# Patient Record
Sex: FEMALE | Race: BLACK OR AFRICAN AMERICAN | NOT HISPANIC OR LATINO | Employment: UNEMPLOYED | URBAN - METROPOLITAN AREA
[De-identification: names, ages, dates, MRNs, and addresses within clinical notes are randomized per-mention and may not be internally consistent; named-entity substitution may affect disease eponyms.]

---

## 2017-03-08 PROCEDURE — 99284 EMERGENCY DEPT VISIT MOD MDM: CPT | Mod: EDC

## 2017-03-09 ENCOUNTER — HOSPITAL ENCOUNTER (EMERGENCY)
Facility: MEDICAL CENTER | Age: 32
End: 2017-03-09
Attending: EMERGENCY MEDICINE
Payer: MEDICAID

## 2017-03-09 VITALS
TEMPERATURE: 98.8 F | DIASTOLIC BLOOD PRESSURE: 76 MMHG | RESPIRATION RATE: 17 BRPM | HEART RATE: 68 BPM | BODY MASS INDEX: 25.44 KG/M2 | OXYGEN SATURATION: 96 % | SYSTOLIC BLOOD PRESSURE: 110 MMHG | HEIGHT: 66 IN | WEIGHT: 158.29 LBS

## 2017-03-09 DIAGNOSIS — L73.9 FOLLICULITIS: ICD-10-CM

## 2017-03-09 PROCEDURE — 700102 HCHG RX REV CODE 250 W/ 637 OVERRIDE(OP): Mod: EDC | Performed by: EMERGENCY MEDICINE

## 2017-03-09 PROCEDURE — A9270 NON-COVERED ITEM OR SERVICE: HCPCS | Mod: EDC | Performed by: EMERGENCY MEDICINE

## 2017-03-09 RX ORDER — DIPHENHYDRAMINE HCL 25 MG
50 TABLET ORAL ONCE
Status: COMPLETED | OUTPATIENT
Start: 2017-03-09 | End: 2017-03-09

## 2017-03-09 RX ORDER — CLINDAMYCIN HYDROCHLORIDE 150 MG/1
300 CAPSULE ORAL 3 TIMES DAILY
Qty: 40 CAP | Refills: 0 | Status: SHIPPED | OUTPATIENT
Start: 2017-03-09 | End: 2017-03-16

## 2017-03-09 RX ORDER — HYDROXYZINE HYDROCHLORIDE 25 MG/1
25 TABLET, FILM COATED ORAL EVERY 6 HOURS PRN
Qty: 30 TAB | Refills: 0 | Status: SHIPPED | OUTPATIENT
Start: 2017-03-09

## 2017-03-09 RX ORDER — CLINDAMYCIN HYDROCHLORIDE 150 MG/1
300 CAPSULE ORAL ONCE
Status: COMPLETED | OUTPATIENT
Start: 2017-03-09 | End: 2017-03-09

## 2017-03-09 RX ADMIN — CLINDAMYCIN HYDROCHLORIDE 300 MG: 150 CAPSULE ORAL at 01:22

## 2017-03-09 RX ADMIN — DIPHENHYDRAMINE HCL 50 MG: 25 TABLET ORAL at 01:22

## 2017-03-09 NOTE — ED AVS SNAPSHOT
3/9/2017          Kristine Waldrop  [address]  [place] [address]    Dear Kristnie:    [facility] wants to ensure your discharge home is safe and you or your loved ones have had all your questions answered regarding your care after you leave the hospital.    You may receive a telephone call within two days of your discharge.  This call is to make certain you understand your discharge instructions as well as ensure we provided you with the best care possible during your stay with us.     The call will only last approximately 3-5 minutes and will be done by a nurse.    Once again, we want to ensure your discharge home is safe and that you have a clear understanding of any next steps in your care.  If you have any questions or concerns, please do not hesitate to contact us, we are here for you.  Thank you for choosing [facility] for your healthcare needs.    Sincerely,    Jm Hahn    [facility]

## 2017-03-09 NOTE — ED AVS SNAPSHOT
Home Care Instructions                                                                                                                Kristine Waldrop   MRN: [id number]    Department:  [facility], Emergency Dept   Date of Visit:  3/8/2017            [facility], Emergency Dept    [address] [address]    Phone:  [phone]      You were seen by     Ceasar Rojo M.D.      Your Diagnosis Was     Folliculitis     L73.9       Follow-up Information     1. Follow up with [facility].    Why:  for establishment of primary care, for blood pressure management    Contact information    [address] [place] [address] [phone]        2. Follow up with [facility], Emergency Dept.    Specialty:  Emergency Medicine    Why:  If symptoms worsen    Contact information    [address] [address] [facility] [address] [phone]      Medication Information     Review all of your home medications and newly ordered medications with your primary doctor and/or pharmacist as soon as possible. Follow medication instructions as directed by your doctor and/or pharmacist.     Please keep your complete medication list with you and share with your physician. Update the information when medications are discontinued, doses are changed, or new medications (including over-the-counter products) are added; and carry medication information at all times in the event of emergency situations.               Medication List      START taking these medications        Instructions    Morning Afternoon Evening Bedtime    clindamycin 150 MG Caps   Commonly known as:  CLEOCIN        Take 2 Caps by mouth 3 times a day for 7 days.   Dose:  300 mg                        hydrOXYzine 25 MG Tabs   Commonly known as:  ATARAX        Take 1 Tab by mouth every 6 hours as needed for Itching.   Dose:  25 mg                             Where to Get Your Medications      You can  get these medications from any pharmacy     Bring a paper prescription for each of these medications    - clindamycin 150 MG Caps  - hydrOXYzine 25 MG Tabs              Discharge Instructions       Folliculitis  Folliculitis is redness, soreness, and swelling (inflammation) of the hair follicles. This condition can occur anywhere on the body. People with weakened immune systems, diabetes, or obesity have a greater risk of getting folliculitis.  CAUSES  · Bacterial infection. This is the most common cause.  · Fungal infection.  · Viral infection.  · Contact with certain chemicals, especially oils and tars.  Long-term folliculitis can result from bacteria that live in the nostrils. The bacteria may trigger multiple outbreaks of folliculitis over time.  SYMPTOMS  Folliculitis most commonly occurs on the scalp, thighs, legs, back, buttocks, and areas where hair is shaved frequently. An early sign of folliculitis is a small, white or yellow, pus-filled, itchy lesion (pustule). These lesions appear on a red, inflamed follicle. They are usually less than 0.2 inches (5 mm) wide. When there is an infection of the follicle that goes deeper, it becomes a boil or furuncle. A group of closely packed boils creates a larger lesion (carbuncle). Carbuncles tend to occur in hairy, sweaty areas of the body.  DIAGNOSIS   Your caregiver can usually tell what is wrong by doing a physical exam. A sample may be taken from one of the lesions and tested in a lab. This can help determine what is causing your folliculitis.  TREATMENT   Treatment may include:  · Applying warm compresses to the affected areas.  · Taking antibiotic medicines orally or applying them to the skin.  · Draining the lesions if they contain a large amount of pus or fluid.  · Laser hair removal for cases of long-lasting folliculitis. This helps to prevent regrowth of the hair.  HOME CARE INSTRUCTIONS  · Apply warm compresses to the affected areas as directed by your  caregiver.  · If antibiotics are prescribed, take them as directed. Finish them even if you start to feel better.  · You may take over-the-counter medicines to relieve itching.  · Do not shave irritated skin.  · Follow up with your caregiver as directed.  SEEK IMMEDIATE MEDICAL CARE IF:   · You have increasing redness, swelling, or pain in the affected area.  · You have a fever.  MAKE SURE YOU:  · Understand these instructions.  · Will watch your condition.  · Will get help right away if you are not doing well or get worse.     This information is not intended to replace advice given to you by your health care provider. Make sure you discuss any questions you have with your health care provider.     Document Released: 02/26/2003 Document Revised: 01/08/2016 Document Reviewed: 03/19/2013  [employer] Interactive Patient Education ©2016 [employer]            Patient Information     Patient Information    Following emergency treatment: all patient requiring follow-up care must return either to a private physician or a clinic if your condition worsens before you are able to obtain further medical attention, please return to the emergency room.     Billing Information    At [facility], we work to make the billing process streamlined for our patients.  Our Representatives are here to answer any questions you may have regarding your hospital bill.  If you have insurance coverage and have supplied your insurance information to us, we will submit a claim to your insurer on your behalf.  Should you have any questions regarding your bill, we can be reached online or by phone as follows:  Online: You are able pay your bills online or live chat with our representatives about any billing questions you may have. We are here to help Monday - Friday from 8:00am to 7:30pm and 9:00am - 12:00pm on Saturdays.  Please visit [email or web address]  for more information.   Phone:  [phone] or  [phone]    Please note that your emergency physician, surgeon, pathologist, radiologist, anesthesiologist, and other specialists are not employed by [facility] and will therefore bill separately for their services.  Please contact them directly for any questions concerning their bills at the numbers below:     Emergency Physician Services:  [phone]  [facility]:  [phone]  [facility]:  [phone]  [facility]:  [phone]    1. Your final bill may vary from the amount quoted upon discharge if all procedures are not complete at that time, or if your doctor has additional procedures of which we are not aware. You will receive an additional bill if you return to the Emergency Department at [facility] for suture removal regardless of the facility of which the sutures were placed.     2. Please arrange for settlement of this account at the emergency registration.    3. All self-pay accounts are due in full at the time of treatment.  If you are unable to meet this obligation then payment is expected within 4-5 days.     4. If you have had radiology studies (CT, X-ray, Ultrasound, MRI), you have received a preliminary result during your emergency department visit. Please contact the radiology department [phone] to receive a copy of your final result. Please discuss the Final result with your primary physician or with the follow up physician provided.     Crisis Hotline:  [place] Crisis Hotline:  0-889-HTDCDYP or 1-619.801.6228  [place] Crisis Hotline:    1-405.827.6763 or 736-956-1552         ED Discharge Follow Up Questions    1. In order to provide you with very good care, we would like to follow up with a phone call in the next few days.  May we have your permission to contact you?     YES /  NO    2. What is the best phone number to call you? (       )_____-__________    3. What is the best time to call you?      Morning  /  Afternoon  /   Evening                   Patient Signature:  ____________________________________________________________    Date:  ____________________________________________________________

## 2017-03-09 NOTE — DISCHARGE INSTRUCTIONS
Folliculitis  Folliculitis is redness, soreness, and swelling (inflammation) of the hair follicles. This condition can occur anywhere on the body. People with weakened immune systems, diabetes, or obesity have a greater risk of getting folliculitis.  CAUSES  · Bacterial infection. This is the most common cause.  · Fungal infection.  · Viral infection.  · Contact with certain chemicals, especially oils and tars.  Long-term folliculitis can result from bacteria that live in the nostrils. The bacteria may trigger multiple outbreaks of folliculitis over time.  SYMPTOMS  Folliculitis most commonly occurs on the scalp, thighs, legs, back, buttocks, and areas where hair is shaved frequently. An early sign of folliculitis is a small, white or yellow, pus-filled, itchy lesion (pustule). These lesions appear on a red, inflamed follicle. They are usually less than 0.2 inches (5 mm) wide. When there is an infection of the follicle that goes deeper, it becomes a boil or furuncle. A group of closely packed boils creates a larger lesion (carbuncle). Carbuncles tend to occur in hairy, sweaty areas of the body.  DIAGNOSIS   Your caregiver can usually tell what is wrong by doing a physical exam. A sample may be taken from one of the lesions and tested in a lab. This can help determine what is causing your folliculitis.  TREATMENT   Treatment may include:  · Applying warm compresses to the affected areas.  · Taking antibiotic medicines orally or applying them to the skin.  · Draining the lesions if they contain a large amount of pus or fluid.  · Laser hair removal for cases of long-lasting folliculitis. This helps to prevent regrowth of the hair.  HOME CARE INSTRUCTIONS  · Apply warm compresses to the affected areas as directed by your caregiver.  · If antibiotics are prescribed, take them as directed. Finish them even if you start to feel better.  · You may take over-the-counter medicines to relieve itching.  · Do not shave irritated  skin.  · Follow up with your caregiver as directed.  SEEK IMMEDIATE MEDICAL CARE IF:   · You have increasing redness, swelling, or pain in the affected area.  · You have a fever.  MAKE SURE YOU:  · Understand these instructions.  · Will watch your condition.  · Will get help right away if you are not doing well or get worse.     This information is not intended to replace advice given to you by your health care provider. Make sure you discuss any questions you have with your health care provider.     Document Released: 02/26/2003 Document Revised: 01/08/2016 Document Reviewed: 03/19/2013  [employer] Interactive Patient Education ©2016 [employer]

## 2017-03-09 NOTE — ED NOTES
Bedside report completed with MARGIE Nieto.  POC reviewed with all questions and concerns addressed.

## 2017-03-09 NOTE — ED AVS SNAPSHOT
[employer] Access Code: [email or web address]  Expires: 4/8/2017  1:18 AM    Your email address is not on file at [facility].  Email Addresses are required for you to sign up for [employer], please contact [phone] to verify your personal information and to provide your email address prior to attempting to register for [employer].    Kristine Waldrop  [address] [place] [address]    [employer]  A secure, online tool to manage your health information     [facility]’s Kopo Kopo® is a secure, online tool that connects you to your personalized health information from the privacy of your home -- day or night - making it very easy for you to manage your healthcare. Once the activation process is completed, you can even access your medical information using the Kopo Kopo liset, which is available for free in the Apple Liset store or Google Play store.     To learn more about Kopo Kopo, visit [email or web address]    There are two levels of access available (as shown below):   My Chart Features  [facility] Primary Care Doctor [facility]  Specialists [facility]  Urgent  Care Non-[facility] Primary Care Doctor   Email your healthcare team securely and privately 24/7 X X X    Manage appointments: schedule your next appointment; view details of past/upcoming appointments X      Request prescription refills. X      View recent personal medical records, including lab and immunizations X X X X   View health record, including health history, allergies, medications X X X X   Read reports about your outpatient visits, procedures, consult and ER notes X X X X   See your discharge summary, which is a recap of your hospital and/or ER visit that includes your diagnosis, lab results, and care plan X X  X     How to register for [employer]:  Once your e-mail address has been verified, follow the following steps to sign up for [employer].     1. Go to  [email or web address]  2. Click on the Sign Up Now box, which takes you to the New Member Sign Up page. You will need  to provide the following information:  a. Enter your [employer] Access Code exactly as it appears at the top of this page. (You will not need to use this code after you’ve completed the sign-up process. If you do not sign up before the expiration date, you must request a new code.)   b. Enter your date of birth.   c. Enter your home email address.   d. Click Submit, and follow the next screen’s instructions.  3. Create a [employer] ID. This will be your [employer] login ID and cannot be changed, so think of one that is secure and easy to remember.  4. Create a [employer] password. You can change your password at any time.  5. Enter your Password Reset Question and Answer. This can be used at a later time if you forget your password.   6. Enter your e-mail address. This allows you to receive e-mail notifications when new information is available in [employer].  7. Click Sign Up. You can now view your health information.    For assistance activating your [employer] account, call [phone]

## 2017-03-09 NOTE — ED NOTES
Patient to peds 41 - dressed into gown.  Patient denies any known allergies, new lotions, soaps, or medications.  Patient continues to itch her back and under her bra line.  Chart up for ERP.  Will continue to assess.

## 2017-03-09 NOTE — ED NOTES
Chief Complaint   Patient presents with   • Rash     on back X 3-4 days.  states itches.     Denies sob, difficulty breathy or swallowing.  Appears to be in minimal distress.  Triage process explained to patient.  Pt back to waiting room.  Pt instructed to inform RN if any changes or questions arise.

## 2017-03-09 NOTE — ED PROVIDER NOTES
"ED Provider Note    ED Provider Note      CHIEF COMPLAINT  Chief Complaint   Patient presents with   • Rash     on back X 3-4 days.  states itches.       HPI  Kristine Waldrop is a 32 y.o. female who presents to the Emergency Department chief complaint rash. Patient states that she's been experiencing this rash for multiple weeks was on her extensor aspects of her upper and lower extremities she now reports that the rash is troubled her back it is coming extremely pruritic somewhat red and raised in areas. Denies any fevers chills nausea vomiting she denies any new exposures to food soaps detergents or other contacts. Patient does admit to methamphetamine use but states that this hasn't been within the last few weeks and not since the rashes change. She reports the lesions are somewhat painful and rates this as 2 out of 10 without alleviating factors no fevers chills nausea vomiting headache altered mental status no involvement of the palms and soles of feet mouth or any other mucosal aspect.    REVIEW OF SYSTEMS  Pertinent positives include rash, pruritus . Pertinent negatives include no headache altered mental status involvement of palms and soles of feet mucosal surfaces fevers chills nausea vomiting.     PAST MEDICAL HISTORY       SURGICAL HISTORY  patient denies any surgical history    SOCIAL HISTORY  Social History   Substance Use Topics   • Smoking status: Not on file   • Smokeless tobacco: Not on file   • Alcohol Use: Not on file      History   Drug Use Not on file       FAMILY HISTORY  Non-contributory    CURRENT MEDICATIONS  Home Medications     **Home medications have not yet been reviewed for this encounter**          ALLERGIES  No Known Allergies    PHYSICAL EXAM  VITAL SIGNS: /76 mmHg  Pulse 68  Temp(Src) 37.1 °C (98.8 °F)  Resp 17  Ht 1.676 m (5' 5.98\")  Wt 71.8 kg (158 lb 4.6 oz)  BMI 25.56 kg/m2  SpO2 96%  Constitutional: Alert and oriented x 3, minimal distress.  HENT: Normocephalic, " "Atraumatic, Bilateral external ears normal. Nose normal.   Eyes: Pupils are equal and reactive. Conjunctiva normal, non-icteric.   Heart: Regular rate and rythm, no murmurs, equal pulses peripherally x 4.    Lungs: Clear to auscultation bilaterally, no wheezes rales or rhonchi  GI: Soft nontender nondistended positive bowel sounds.  Skin: Erythematous papillary lesions diffusely over the back and the extensor aspects of the upper extremities no areas of confluence, areas do seem to be distributed in a follicular pattern. No fluctuance no drainage  Neurologic: Cranial nerves III through XII grossly intact no sensory deficit no cerebellar dysfunction.   Psychiatric: Appropriate affect for situation          COURSE & MEDICAL DECISION MAKING  Nursing notes, VS, PMSFHx reviewed in chart.        Medical Decision Making: Patient has a diffuse rash of the back and extensor aspects of the upper extremities are erythematous slightly tender no fluctuance no drainage extremely pruritic. Patient given dose of Benadryl clindamycin should be discharged with prescription for clindamycin and Atarax. Return for worsening pain redness swelling fevers chills discharge any other acute concerns otherwise follow-up with primary care as needed. Discharged home in stable condition.  /76 mmHg  Pulse 68  Temp(Src) 37.1 °C (98.8 °F)  Resp 17  Ht 1.676 m (5' 5.98\")  Wt 71.8 kg (158 lb 4.6 oz)  BMI 25.56 kg/m2  SpO2 96%    [address] [address] [phone]    for establishment of primary care, for blood pressure management    [facility], Emergency Dept  [address] [address] [phone]    If symptoms worsen          FINAL IMPRESSION  1. Folliculitis    2. Rash     This dictation has been created using voice recognition software and/or scribes. The accuracy of the dictation is limited by the abilities of the software and the expertise of the scribes. " I expect there may be some errors of grammar and possibly content. I made every attempt to manually correct the errors within my dictation. However, errors related to voice recognition software and/or scribes may still exist and should be interpreted within the appropriate context.

## 2017-03-09 NOTE — ED NOTES
jonnie sy D/C'stephan.  Discharge instructions including the importance of hydration, the use of OTC medications, information on folloclitis and the proper follow up recommendations have been provided to the pt.  Pt states understanding.  Pt states all questions have been answered.  A copy of the discharge instructions have been provided to pt.  A signed copy is in the chart.  Prescription for atarax and clindamycin provided to pt.   Pt walked out of department; pt in NAD, awake, alert, interactive and age appropriate

## 2017-03-30 ENCOUNTER — HOSPITAL ENCOUNTER (EMERGENCY)
Facility: MEDICAL CENTER | Age: 32
End: 2017-03-30
Attending: EMERGENCY MEDICINE
Payer: MEDICAID

## 2017-03-30 VITALS
DIASTOLIC BLOOD PRESSURE: 95 MMHG | WEIGHT: 156.97 LBS | HEART RATE: 121 BPM | TEMPERATURE: 98.2 F | OXYGEN SATURATION: 100 % | BODY MASS INDEX: 25.35 KG/M2 | SYSTOLIC BLOOD PRESSURE: 138 MMHG | RESPIRATION RATE: 16 BRPM

## 2017-03-30 DIAGNOSIS — L30.9 ECZEMA, UNSPECIFIED TYPE: ICD-10-CM

## 2017-03-30 DIAGNOSIS — Z87.2 HISTORY OF URTICARIA: ICD-10-CM

## 2017-03-30 DIAGNOSIS — R21 RASH: ICD-10-CM

## 2017-03-30 PROCEDURE — 700102 HCHG RX REV CODE 250 W/ 637 OVERRIDE(OP): Performed by: EMERGENCY MEDICINE

## 2017-03-30 PROCEDURE — A9270 NON-COVERED ITEM OR SERVICE: HCPCS | Performed by: EMERGENCY MEDICINE

## 2017-03-30 PROCEDURE — 99283 EMERGENCY DEPT VISIT LOW MDM: CPT

## 2017-03-30 RX ORDER — HYDROXYZINE HYDROCHLORIDE 25 MG/1
25 TABLET, FILM COATED ORAL 3 TIMES DAILY PRN
Qty: 30 TAB | Refills: 0 | Status: SHIPPED | OUTPATIENT
Start: 2017-03-30

## 2017-03-30 RX ORDER — ACETAMINOPHEN 325 MG/1
650 TABLET ORAL ONCE
Status: COMPLETED | OUTPATIENT
Start: 2017-03-30 | End: 2017-03-30

## 2017-03-30 RX ORDER — TRIAMCINOLONE ACETONIDE 5 MG/G
OINTMENT TOPICAL
Qty: 1 TUBE | Refills: 0 | Status: SHIPPED | OUTPATIENT
Start: 2017-03-30 | End: 2017-04-06

## 2017-03-30 RX ORDER — DIPHENHYDRAMINE HCL 25 MG
25 TABLET ORAL ONCE
Status: COMPLETED | OUTPATIENT
Start: 2017-03-30 | End: 2017-03-30

## 2017-03-30 RX ADMIN — ACETAMINOPHEN 650 MG: 325 TABLET, FILM COATED ORAL at 10:30

## 2017-03-30 RX ADMIN — DIPHENHYDRAMINE HCL 25 MG: 25 TABLET ORAL at 10:30

## 2017-03-30 ASSESSMENT — PAIN SCALES - GENERAL: PAINLEVEL_OUTOF10: 9

## 2017-03-30 ASSESSMENT — LIFESTYLE VARIABLES: DO YOU DRINK ALCOHOL: NO

## 2017-03-30 NOTE — ED PROVIDER NOTES
ED Provider Note    Scribed for Oleg Valencia M.D. by Miguel Ángel Miller. 3/30/2017, 10:17 AM.    Primary care provider: Pcp Pt States None  Means of arrival: Walk-in  History obtained from: Patient  History limited by: None    CHIEF COMPLAINT  Chief Complaint   Patient presents with   • Hives     pt reports that she had hives, worse at night, and causing headache. taking benadryl without relief.      HPI  Kristine Waldrop is a 32 y.o. female who presents to the Emergency Department with worsening rashes onset several weeks ago. The patient was seen in the ED on 3/9/2017 for similar symptoms and was given Benadryl with minimal relief. She states she continues to experience pruritic rashes on her back that worsens at night and denies any fevers. She also denies any new medications or foods. Patient denies past history of chicken pox or hives.    REVIEW OF SYSTEMS  Pertinent positives include rash.   Pertinent negatives include no fever.    E.      PAST MEDICAL HISTORY  No history of chicken pox.    SURGICAL HISTORY  patient denies any surgical history    SOCIAL HISTORY   History   Drug Use  Yes        FAMILY HISTORY  No family history noted    CURRENT MEDICATIONS    Current outpatient prescriptions:   •  triamcinolone (ARISTOCORT) 0.5 % ointment, Apply to the affected areas twice daily for 7 days., Disp: 1 Tube, Rfl: 0  •  hydrOXYzine (ATARAX) 25 MG Tab, Take 1 Tab by mouth every 6 hours as needed for Itching., Disp: 30 Tab, Rfl: 0    ALLERGIES  No Known Allergies    PHYSICAL EXAM  VITAL SIGNS: /95 mmHg  Pulse 121  Temp(Src) 36.8 °C (98.2 °F)  Resp 16  Wt 71.2 kg (156 lb 15.5 oz)  SpO2 100%    Nursing note and vitals reviewed.  Constitutional: No distress.   HENT: Head is atraumatic. Oropharynx is moist.   Eyes: Conjunctivae are normal. Pupils are equal, round, and reactive to light.   Cardiovascular: Normal peripheral perfusion  Respiratory: No respiratory distress.   Musculoskeletal: Normal range of motion.    Neurological: Alert. No focal deficits noted.    Skin: Scattered excoriations skin changes in the mid back most consistent with contact dermatitis/eczema. No apparent urticaria.   Psych: Appropriate for clinical situation     COURSE & MEDICAL DECISION MAKING  Nursing notes, VS, PMSFHx reviewed in chart.    Review of past medical records shows the patient was last seen in the ED on the 3/9/2017 for rash.      10:17 AM - Patient seen and examined at bedside. Patient will be treated with Benadryl 25mg PO and Tylenol 650mg PO. The patient will be discharged with Aristocort and Atarax and should return if symptoms worsen or if new symptoms arise. The patient understands and agrees to plan.     DISPOSITION:  Patient will be discharged home in stable condition.    FOLLOW UP:  [facility], Emergency Dept  [address] [address] [phone]    If symptoms worsen    [address] [address]  [phone]  Schedule an appointment as soon as possible for a visit        OUTPATIENT MEDICATIONS:  New Prescriptions    HYDROXYZINE (ATARAX) 25 MG TAB    Take 1 Tab by mouth 3 times a day as needed for Itching.    TRIAMCINOLONE (ARISTOCORT) 0.5 % OINTMENT    Apply to the affected areas twice daily for 7 days.     The patient was discharged home with an information sheet on Eczema and told to return immediately for any signs or symptoms listed.  The patient agreed to the discharge precautions and follow-up plan which is documented in EPIC.    FINAL IMPRESSION  1. Rash    2. History of urticaria    3. Eczema, unspecified type        IMiguel Ángel), am scribing for, and in the presence of, Oleg Valencia M.D    Electronically signed by: Miguel Ángel Miller (Pramod), 3/30/2017    IOleg M.D. providers found personally performed the services described in this documentation, as scribed by Miguel Ángel Miller in my presence, and it is both accurate and  complete.    The note accurately reflects work and decisions made by me.  Oleg Valencia  3/30/2017  2:00 PM

## 2017-03-30 NOTE — ED NOTES
Pt ambulatory at discharge. No rash or hives noted. Verbalized understanding of all discharge and follow up instructions.

## 2017-03-30 NOTE — DISCHARGE INSTRUCTIONS
Eczema  Eczema, also called atopic dermatitis, is a skin disorder that causes inflammation of the skin. It causes a red rash and dry, scaly skin. The skin becomes very itchy. Eczema is generally worse during the cooler winter months and often improves with the warmth of summer. Eczema usually starts showing signs in infancy. Some children outgrow eczema, but it may last through adulthood.   CAUSES   The exact cause of eczema is not known, but it appears to run in families. People with eczema often have a family history of eczema, allergies, asthma, or hay fever. Eczema is not contagious.  Flare-ups of the condition may be caused by:   · Contact with something you are sensitive or allergic to.    · Stress.  SIGNS AND SYMPTOMS  · Dry, scaly skin.    · Red, itchy rash.    · Itchiness. This may occur before the skin rash and may be very intense.    DIAGNOSIS   The diagnosis of eczema is usually made based on symptoms and medical history.  TREATMENT   Eczema cannot be cured, but symptoms usually can be controlled with treatment and other strategies. A treatment plan might include:  · Controlling the itching and scratching.    ¨ Use over-the-counter antihistamines as directed for itching. This is especially useful at night when the itching tends to be worse.    ¨ Use over-the-counter steroid creams as directed for itching.    ¨ Avoid scratching. Scratching makes the rash and itching worse. It may also result in a skin infection (impetigo) due to a break in the skin caused by scratching.    · Keeping the skin well moisturized with creams every day. This will seal in moisture and help prevent dryness. Lotions that contain alcohol and water should be avoided because they can dry the skin.    · Limiting exposure to things that you are sensitive or allergic to (allergens).    · Recognizing situations that cause stress.    · Developing a plan to manage stress.    HOME CARE INSTRUCTIONS   · Only take over-the-counter or  prescription medicines as directed by your health care provider.    · Do not use anything on the skin without checking with your health care provider.    · Keep baths or showers short (5 minutes) in warm (not hot) water. Use mild cleansers for bathing. These should be unscented. You may add nonperfumed bath oil to the bath water. It is best to avoid soap and bubble bath.    · Immediately after a bath or shower, when the skin is still damp, apply a moisturizing ointment to the entire body. This ointment should be a petroleum ointment. This will seal in moisture and help prevent dryness. The thicker the ointment, the better. These should be unscented.    · Keep fingernails cut short. Children with eczema may need to wear soft gloves or mittens at night after applying an ointment.    · Dress in clothes made of cotton or cotton blends. Dress lightly, because heat increases itching.    · A child with eczema should stay away from anyone with fever blisters or cold sores. The virus that causes fever blisters (herpes simplex) can cause a serious skin infection in children with eczema.  SEEK MEDICAL CARE IF:   · Your itching interferes with sleep.    · Your rash gets worse or is not better within 1 week after starting treatment.    · You see pus or soft yellow scabs in the rash area.    · You have a fever.    · You have a rash flare-up after contact with someone who has fever blisters.       This information is not intended to replace advice given to you by your health care provider. Make sure you discuss any questions you have with your health care provider.     Document Released: 12/15/2001 Document Revised: 10/08/2014 Document Reviewed: 07/21/2014  [employer] Interactive Patient Education ©2016 [employer]

## 2017-03-30 NOTE — ED AVS SNAPSHOT
3/30/2017          Kristine Waldrop  [address]  [place] [address]    Dear Kristine:    [facility] wants to ensure your discharge home is safe and you or your loved ones have had all your questions answered regarding your care after you leave the hospital.    You may receive a telephone call within two days of your discharge.  This call is to make certain you understand your discharge instructions as well as ensure we provided you with the best care possible during your stay with us.     The call will only last approximately 3-5 minutes and will be done by a nurse.    Once again, we want to ensure your discharge home is safe and that you have a clear understanding of any next steps in your care.  If you have any questions or concerns, please do not hesitate to contact us, we are here for you.  Thank you for choosing [facility] for your healthcare needs.    Sincerely,    Jm Hahn    [facility]

## 2017-03-30 NOTE — ED NOTES
Chief Complaint   Patient presents with   • Hives     pt reports that she had hives, worse at night, and causing headache. taking benadryl without relief.      Pt itching in the lobby.   Blood pressure 138/95, pulse 121, temperature 36.8 °C (98.2 °F), resp. rate 16, weight 71.2 kg (156 lb 15.5 oz), SpO2 100 %.  Pt informed of wait times. Educated on triage process.  Asked to return to triage RN for any new or worsening of symptoms. Thanked for patience.

## 2017-03-30 NOTE — ED AVS SNAPSHOT
Front Flip Access Code: [email or web address]  Expires: 4/8/2017  2:18 AM    Your email address is not on file at [employer].  Email Addresses are required for you to sign up for Front Flip, please contact [phone] to verify your personal information and to provide your email address prior to attempting to register for Front Flip.    Kristine Waldrop  [address] [place] [address]    Front Flip  A secure, online tool to manage your health information     [employer]’s Front Flip® is a secure, online tool that connects you to your personalized health information from the privacy of your home -- day or night - making it very easy for you to manage your healthcare. Once the activation process is completed, you can even access your medical information using the Front Flip liset, which is available for free in the Apple Liset store or Google Play store.     To learn more about Front Flip, visit [email or web address]    There are two levels of access available (as shown below):   My Chart Features  [facility] Primary Care Doctor [facility]  Specialists [facility]  Urgent  Care Non-[facility] Primary Care Doctor   Email your healthcare team securely and privately 24/7 X X X    Manage appointments: schedule your next appointment; view details of past/upcoming appointments X      Request prescription refills. X      View recent personal medical records, including lab and immunizations X X X X   View health record, including health history, allergies, medications X X X X   Read reports about your outpatient visits, procedures, consult and ER notes X X X X   See your discharge summary, which is a recap of your hospital and/or ER visit that includes your diagnosis, lab results, and care plan X X  X     How to register for Kivedat:  Once your e-mail address has been verified, follow the following steps to sign up for Kivedat.     1. Go to  [email or web address]  2. Click on the Sign Up Now box, which takes you to the New Member Sign Up page. You will need  to provide the following information:  a. Enter your TakeCharge Access Code exactly as it appears at the top of this page. (You will not need to use this code after you’ve completed the sign-up process. If you do not sign up before the expiration date, you must request a new code.)   b. Enter your date of birth.   c. Enter your home email address.   d. Click Submit, and follow the next screen’s instructions.  3. Create a TakeCharge ID. This will be your TakeCharge login ID and cannot be changed, so think of one that is secure and easy to remember.  4. Create a TakeCharge password. You can change your password at any time.  5. Enter your Password Reset Question and Answer. This can be used at a later time if you forget your password.   6. Enter your e-mail address. This allows you to receive e-mail notifications when new information is available in TakeCharge.  7. Click Sign Up. You can now view your health information.    For assistance activating your TakeCharge account, call [phone]

## 2017-04-08 ENCOUNTER — HOSPITAL ENCOUNTER (EMERGENCY)
Facility: MEDICAL CENTER | Age: 32
End: 2017-04-09
Attending: EMERGENCY MEDICINE
Payer: MEDICAID

## 2017-04-08 VITALS
DIASTOLIC BLOOD PRESSURE: 78 MMHG | SYSTOLIC BLOOD PRESSURE: 118 MMHG | TEMPERATURE: 97.2 F | HEART RATE: 89 BPM | OXYGEN SATURATION: 100 % | RESPIRATION RATE: 14 BRPM | BODY MASS INDEX: 25.26 KG/M2 | WEIGHT: 157.19 LBS | HEIGHT: 66 IN

## 2017-04-08 DIAGNOSIS — L24.89 IRRITANT CONTACT DERMATITIS DUE TO OTHER AGENTS: ICD-10-CM

## 2017-04-08 PROCEDURE — 99282 EMERGENCY DEPT VISIT SF MDM: CPT

## 2017-04-08 ASSESSMENT — LIFESTYLE VARIABLES: DO YOU DRINK ALCOHOL: NO

## 2017-04-08 NOTE — ED AVS SNAPSHOT
4/9/2017          Kristine Waldrop  [address]  [place] [address]    Dear Kristine:    [facility] wants to ensure your discharge home is safe and you or your loved ones have had all your questions answered regarding your care after you leave the hospital.    You may receive a telephone call within two days of your discharge.  This call is to make certain you understand your discharge instructions as well as ensure we provided you with the best care possible during your stay with us.     The call will only last approximately 3-5 minutes and will be done by a nurse.    Once again, we want to ensure your discharge home is safe and that you have a clear understanding of any next steps in your care.  If you have any questions or concerns, please do not hesitate to contact us, we are here for you.  Thank you for choosing [facility] for your healthcare needs.    Sincerely,    Jm Hahn    [facility]

## 2017-04-08 NOTE — ED AVS SNAPSHOT
MediaSilo Access Code: [email or web address]  Expires: 5/9/2017 12:02 AM    Your email address is not on file at [employer].  Email Addresses are required for you to sign up for MediaSilo, please contact [phone] to verify your personal information and to provide your email address prior to attempting to register for MediaSilo.    Kristine Waldrop  [address] [place] [address]    [employer]  A secure, online tool to manage your health information     [employer]’s MediaSilo® is a secure, online tool that connects you to your personalized health information from the privacy of your home -- day or night - making it very easy for you to manage your healthcare. Once the activation process is completed, you can even access your medical information using the MediaSilo liset, which is available for free in the Apple Liset store or Google Play store.     To learn more about MediaSilo, visit [email or web address]    There are two levels of access available (as shown below):   My Chart Features  [facility] Primary Care Doctor [facility]  Specialists [facility]  Urgent  Care Non-[facility] Primary Care Doctor   Email your healthcare team securely and privately 24/7 X X X    Manage appointments: schedule your next appointment; view details of past/upcoming appointments X      Request prescription refills. X      View recent personal medical records, including lab and immunizations X X X X   View health record, including health history, allergies, medications X X X X   Read reports about your outpatient visits, procedures, consult and ER notes X X X X   See your discharge summary, which is a recap of your hospital and/or ER visit that includes your diagnosis, lab results, and care plan X X  X     How to register for Nomis Solutionst:  Once your e-mail address has been verified, follow the following steps to sign up for MediaSilo.     1. Go to  [email or web address]  2. Click on the Sign Up Now box, which takes you to the New Member Sign Up page. You will need  to provide the following information:  a. Enter your MitraSpan Access Code exactly as it appears at the top of this page. (You will not need to use this code after you’ve completed the sign-up process. If you do not sign up before the expiration date, you must request a new code.)   b. Enter your date of birth.   c. Enter your home email address.   d. Click Submit, and follow the next screen’s instructions.  3. Create a MitraSpan ID. This will be your MitraSpan login ID and cannot be changed, so think of one that is secure and easy to remember.  4. Create a MitraSpan password. You can change your password at any time.  5. Enter your Password Reset Question and Answer. This can be used at a later time if you forget your password.   6. Enter your e-mail address. This allows you to receive e-mail notifications when new information is available in MitraSpan.  7. Click Sign Up. You can now view your health information.    For assistance activating your MitraSpan account, call [phone]

## 2017-04-08 NOTE — ED AVS SNAPSHOT
Home Care Instructions                                                                                                                Kristine Waldrop   MRN: [id number]    Department:  [facility], Emergency Dept   Date of Visit:  4/8/2017            [facility], Emergency Dept    [address]    [place] [address]    Phone:  [phone]      You were seen by     José Lainez M.D.      Your Diagnosis Was     Irritant contact dermatitis due to other agents     L24.89       Follow-up Information     1. Schedule an appointment as soon as possible for a visit with [facility].    Contact information    [address]  [place] [id number] [phone]        Medication Information     Review all of your home medications and newly ordered medications with your primary doctor and/or pharmacist as soon as possible. Follow medication instructions as directed by your doctor and/or pharmacist.     Please keep your complete medication list with you and share with your physician. Update the information when medications are discontinued, doses are changed, or new medications (including over-the-counter products) are added; and carry medication information at all times in the event of emergency situations.               Medication List      ASK your doctor about these medications        Instructions    Morning Afternoon Evening Bedtime    * hydrOXYzine 25 MG Tabs   Commonly known as:  ATARAX        Take 1 Tab by mouth every 6 hours as needed for Itching.   Dose:  25 mg                        * hydrOXYzine 25 MG Tabs   Commonly known as:  ATARAX        Take 1 Tab by mouth 3 times a day as needed for Itching.   Dose:  25 mg                        * Notice:  This list has 2 medication(s) that are the same as other medications prescribed for you. Read the directions carefully, and ask your doctor or other care provider to review them with you.              Discharge Instructions          Contact Dermatitis  Contact dermatitis is a reaction to certain substances that touch the skin. Contact dermatitis can be either irritant contact dermatitis or allergic contact dermatitis. Irritant contact dermatitis does not require previous exposure to the substance for a reaction to occur. Allergic contact dermatitis only occurs if you have been exposed to the substance before. Upon a repeat exposure, your body reacts to the substance.   CAUSES   Many substances can cause contact dermatitis. Irritant dermatitis is most commonly caused by repeated exposure to mildly irritating substances, such as:  · Makeup.  · Soaps.  · Detergents.  · Bleaches.  · Acids.  · Metal salts, such as nickel.  Allergic contact dermatitis is most commonly caused by exposure to:  · Poisonous plants.  · Chemicals (deodorants, shampoos).  · Jewelry.  · Latex.  · Neomycin in triple antibiotic cream.  · Preservatives in products, including clothing.  SYMPTOMS   The area of skin that is exposed may develop:  · Dryness or flaking.  · Redness.  · Cracks.  · Itching.  · Pain or a burning sensation.  · Blisters.  With allergic contact dermatitis, there may also be swelling in areas such as the eyelids, mouth, or genitals.   DIAGNOSIS   Your caregiver can usually tell what the problem is by doing a physical exam. In cases where the cause is uncertain and an allergic contact dermatitis is suspected, a patch skin test may be performed to help determine the cause of your dermatitis.  TREATMENT  Treatment includes protecting the skin from further contact with the irritating substance by avoiding that substance if possible. Barrier creams, powders, and gloves may be helpful. Your caregiver may also recommend:  · Steroid creams or ointments applied 2 times daily. For best results, soak the rash area in cool water for 20 minutes. Then apply the medicine. Cover the area with a plastic wrap. You can store the steroid cream in the refrigerator for a  "\"chilly\" effect on your rash. That may decrease itching. Oral steroid medicines may be needed in more severe cases.  · Antibiotics or antibacterial ointments if a skin infection is present.  · Antihistamine lotion or an antihistamine taken by mouth to ease itching.  · Lubricants to keep moisture in your skin.  · Burow's solution to reduce redness and soreness or to dry a weeping rash. Mix one packet or tablet of solution in 2 cups cool water. Dip a clean washcloth in the mixture, wring it out a bit, and put it on the affected area. Leave the cloth in place for 30 minutes. Do this as often as possible throughout the day.  · Taking several cornstarch or baking soda baths daily if the area is too large to cover with a washcloth.  Harsh chemicals, such as alkalis or acids, can cause skin damage that is like a burn. You should flush your skin for 15 to 20 minutes with cold water after such an exposure. You should also seek immediate medical care after exposure. Bandages (dressings), antibiotics, and pain medicine may be needed for severely irritated skin.   HOME CARE INSTRUCTIONS  · Avoid the substance that caused your reaction.  · Keep the area of skin that is affected away from hot water, soap, sunlight, chemicals, acidic substances, or anything else that would irritate your skin.  · Do not scratch the rash. Scratching may cause the rash to become infected.  · You may take cool baths to help stop the itching.  · Only take over-the-counter or prescription medicines as directed by your caregiver.  · See your caregiver for follow-up care as directed to make sure your skin is healing properly.  SEEK MEDICAL CARE IF:   · Your condition is not better after 3 days of treatment.  · You seem to be getting worse.  · You see signs of infection such as swelling, tenderness, redness, soreness, or warmth in the affected area.  · You have any problems related to your medicines.     This information is not intended to replace advice " given to you by your health care provider. Make sure you discuss any questions you have with your health care provider.     Document Released: 12/15/2001 Document Revised: 03/11/2013 Document Reviewed: 05/22/2012  Elsevier Interactive Patient Education ©2016 [employer]            Patient Information     Patient Information    Following emergency treatment: all patient requiring follow-up care must return either to a private physician or a clinic if your condition worsens before you are able to obtain further medical attention, please return to the emergency room.     Billing Information    At [facility], we work to make the billing process streamlined for our patients.  Our Representatives are here to answer any questions you may have regarding your hospital bill.  If you have insurance coverage and have supplied your insurance information to us, we will submit a claim to your insurer on your behalf.  Should you have any questions regarding your bill, we can be reached online or by phone as follows:  Online: You are able pay your bills online or live chat with our representatives about any billing questions you may have. We are here to help Monday - Friday from 8:00am to 7:30pm and 9:00am - 12:00pm on Saturdays.  Please visit [email or web address]  for more information.   Phone:  [phone] or [phone]    Please note that your emergency physician, surgeon, pathologist, radiologist, anesthesiologist, and other specialists are not employed by [facility] and will therefore bill separately for their services.  Please contact them directly for any questions concerning their bills at the numbers below:     Emergency Physician Services:  [phone]  [facility]:  [phone]  [facility]:  [phone]  [facility]:  [phone]    1. Your final bill may vary from the amount quoted upon discharge if all procedures are not  complete at that time, or if your doctor has additional procedures of which we are not aware. You will receive an additional bill if you return to the Emergency Department at [facility] for suture removal regardless of the facility of which the sutures were placed.     2. Please arrange for settlement of this account at the emergency registration.    3. All self-pay accounts are due in full at the time of treatment.  If you are unable to meet this obligation then payment is expected within 4-5 days.     4. If you have had radiology studies (CT, X-ray, Ultrasound, MRI), you have received a preliminary result during your emergency department visit. Please contact the radiology department [phone] to receive a copy of your final result. Please discuss the Final result with your primary physician or with the follow up physician provided.     Crisis Hotline:  [place] Crisis Hotline:  1-749-OQXZEKS or 1-844.841.7430  [place] Crisis Hotline:    1-741.694.1669 or 704-177-6784         ED Discharge Follow Up Questions    1. In order to provide you with very good care, we would like to follow up with a phone call in the next few days.  May we have your permission to contact you?     YES /  NO    2. What is the best phone number to call you? (       )_____-__________    3. What is the best time to call you?      Morning  /  Afternoon  /  Evening                   Patient Signature:  ____________________________________________________________    Date:  ____________________________________________________________

## 2017-04-09 PROCEDURE — A9270 NON-COVERED ITEM OR SERVICE: HCPCS | Performed by: EMERGENCY MEDICINE

## 2017-04-09 PROCEDURE — 700102 HCHG RX REV CODE 250 W/ 637 OVERRIDE(OP): Performed by: EMERGENCY MEDICINE

## 2017-04-09 RX ORDER — DIPHENHYDRAMINE HCL 25 MG
50 TABLET ORAL ONCE
Status: COMPLETED | OUTPATIENT
Start: 2017-04-09 | End: 2017-04-09

## 2017-04-09 RX ADMIN — DIPHENHYDRAMINE HCL 50 MG: 25 TABLET ORAL at 00:13

## 2017-04-09 NOTE — DISCHARGE INSTRUCTIONS
"Contact Dermatitis  Contact dermatitis is a reaction to certain substances that touch the skin. Contact dermatitis can be either irritant contact dermatitis or allergic contact dermatitis. Irritant contact dermatitis does not require previous exposure to the substance for a reaction to occur. Allergic contact dermatitis only occurs if you have been exposed to the substance before. Upon a repeat exposure, your body reacts to the substance.   CAUSES   Many substances can cause contact dermatitis. Irritant dermatitis is most commonly caused by repeated exposure to mildly irritating substances, such as:  · Makeup.  · Soaps.  · Detergents.  · Bleaches.  · Acids.  · Metal salts, such as nickel.  Allergic contact dermatitis is most commonly caused by exposure to:  · Poisonous plants.  · Chemicals (deodorants, shampoos).  · Jewelry.  · Latex.  · Neomycin in triple antibiotic cream.  · Preservatives in products, including clothing.  SYMPTOMS   The area of skin that is exposed may develop:  · Dryness or flaking.  · Redness.  · Cracks.  · Itching.  · Pain or a burning sensation.  · Blisters.  With allergic contact dermatitis, there may also be swelling in areas such as the eyelids, mouth, or genitals.   DIAGNOSIS   Your caregiver can usually tell what the problem is by doing a physical exam. In cases where the cause is uncertain and an allergic contact dermatitis is suspected, a patch skin test may be performed to help determine the cause of your dermatitis.  TREATMENT  Treatment includes protecting the skin from further contact with the irritating substance by avoiding that substance if possible. Barrier creams, powders, and gloves may be helpful. Your caregiver may also recommend:  · Steroid creams or ointments applied 2 times daily. For best results, soak the rash area in cool water for 20 minutes. Then apply the medicine. Cover the area with a plastic wrap. You can store the steroid cream in the refrigerator for a \"chilly\" " effect on your rash. That may decrease itching. Oral steroid medicines may be needed in more severe cases.  · Antibiotics or antibacterial ointments if a skin infection is present.  · Antihistamine lotion or an antihistamine taken by mouth to ease itching.  · Lubricants to keep moisture in your skin.  · Burow's solution to reduce redness and soreness or to dry a weeping rash. Mix one packet or tablet of solution in 2 cups cool water. Dip a clean washcloth in the mixture, wring it out a bit, and put it on the affected area. Leave the cloth in place for 30 minutes. Do this as often as possible throughout the day.  · Taking several cornstarch or baking soda baths daily if the area is too large to cover with a washcloth.  Harsh chemicals, such as alkalis or acids, can cause skin damage that is like a burn. You should flush your skin for 15 to 20 minutes with cold water after such an exposure. You should also seek immediate medical care after exposure. Bandages (dressings), antibiotics, and pain medicine may be needed for severely irritated skin.   HOME CARE INSTRUCTIONS  · Avoid the substance that caused your reaction.  · Keep the area of skin that is affected away from hot water, soap, sunlight, chemicals, acidic substances, or anything else that would irritate your skin.  · Do not scratch the rash. Scratching may cause the rash to become infected.  · You may take cool baths to help stop the itching.  · Only take over-the-counter or prescription medicines as directed by your caregiver.  · See your caregiver for follow-up care as directed to make sure your skin is healing properly.  SEEK MEDICAL CARE IF:   · Your condition is not better after 3 days of treatment.  · You seem to be getting worse.  · You see signs of infection such as swelling, tenderness, redness, soreness, or warmth in the affected area.  · You have any problems related to your medicines.     This information is not intended to replace advice given to  you by your health care provider. Make sure you discuss any questions you have with your health care provider.     Document Released: 12/15/2001 Document Revised: 03/11/2013 Document Reviewed: 05/22/2012  Elsevier Interactive Patient Education ©2016 [employer] Inc[employer]

## 2017-04-09 NOTE — ED NOTES
Discharge instructions given to patient, a verbal understanding of all instructions was stated. Pt preferred to walk out. VSS, all belongings accounted for.

## 2017-04-09 NOTE — ED NOTES
Pt. Ambulated back to [facility] [address] with a steady gait. Pt. States she recently had lice and had it treated. Pt. States that she has had continued itching since then and is not sure if the lice is truly gone. Will continue to monitor.

## 2017-04-09 NOTE — ED PROVIDER NOTES
"ED Provider Note    CHIEF COMPLAINT  Chief Complaint   Patient presents with   • Itching     pt states she recently had lice   • Hives     scratching shoulder, back and neck. not sure if it is the lice or an allergy. pt states tingling in legs and \"itching on my inside\"   • Head Lice       HPI  Kristine Waldrop is a 32 y.o. female who presents for evaluation of itching over her left shoulder, and possible hives. Patient states that she has been scratching at her back of her neck, and her left shoulder for the past week. Patient states that 2 days ago the patient noted insect-type organism over her left shoulder. The patient was concerned that she had lice, and states that she had some next which she treated herself with. Patient states that this occurred 2 days ago, and that the patient has continued to have some itching across her neck, and over her left shoulder. The patient states that she decided to present here for evaluation secondary to that. The patient states that she has not attempted to take any medications to control her symptoms currently.    REVIEW OF SYSTEMS  See HPI for further details. All other systems are negative.     PAST MEDICAL HISTORY       SOCIAL HISTORY  Social History     Social History Main Topics   • Smoking status: Never Smoker    • Smokeless tobacco: Not on file   • Alcohol Use: No   • Drug Use: No   • Sexual Activity: Not on file       SURGICAL HISTORY  patient denies any surgical history    CURRENT MEDICATIONS  Home Medications     Reviewed by Cathy Lynn R.N. (Registered Nurse) on 04/08/17 at 2350  Med List Status: Partial    Medication Last Dose Status    hydrOXYzine (ATARAX) 25 MG Tab  Active    hydrOXYzine (ATARAX) 25 MG Tab  Active                ALLERGIES  No Known Allergies    PHYSICAL EXAM  VITAL SIGNS: /78 mmHg  Pulse 89  Temp(Src) 36.2 °C (97.2 °F)  Resp 14  Ht 1.676 m (5' 6\")  Wt 71.3 kg (157 lb 3 oz)  BMI 25.38 kg/m2  SpO2 100%  LMP 03/01/2017   Pulse " ox interpretation: I interpret this pulse ox as normal.  Constitutional: Alert in no apparent distress.  HENT: Normocephalic, no nits or otherwise evidence of live lice present in the patient's hair, Atraumatic, Bilateral external ears normal. Nose normal.   Eyes: Pupils are equal and reactive. Conjunctiva normal, non-icteric.   Heart: Regular rate and rythm.  Lungs: No audible wheezing, no increased work of breathing, no accessory muscle use.  Abdomen: Soft, non-distended, non-tender   Skin: Warm, Dry, No erythema, darkening over the skin over the left shoulder, no noted rashes however, no hives.   Neurologic: Alert, Grossly non-focal.   Psychiatric: Affect normal, Judgment normal, Mood normal, appears alert and not intoxicated.       COURSE & MEDICAL DECISION MAKING  Pertinent Labs & Imaging studies reviewed. (See chart for details)    Patient presenting here for evaluation of pruritus and concern for lice. Here patient has no evidence of lice in her hair, though does have some darkening of the skin over the left shoulder. This appears to be chronic irritation secondary to itching at that site. The patient here was given Benadryl for itching which improved patient's symptoms somewhat. She is encouraged to take it on as-needed basis over-the-counter, for the patient's pruritus. She was instructed to stop scratching the site of her left shoulder and that it should heal thereafter. The patient will follow up with primary care doctor next week.    The patient will not drink alcohol nor drive with prescribed medications. The patient will return for worsening symptoms and is stable at the time of discharge. The patient verbalizes understanding and will comply.    The patient is referred to a primary physician for blood pressure management, diabetic screening, and for all other preventative health concerns, should they be present.    FINAL IMPRESSION    1. Irritant contact dermatitis due to other agents    2.   3.          Electronically signed by: José Lainez, 4/9/2017 12:02 AM      This record was made with a voice recognition software. I have tried to correct any grammar, spelling or context errors to the best of my ability, but errors may still remain. Interpretation of this chart should be taken in this context.

## 2017-04-09 NOTE — ED NOTES
"Pt. Refused to have final vitals done stating \"The blood pressure cuff is too tight it's cutting off my circulation.\"  "

## 2023-03-26 ENCOUNTER — HOSPITAL ENCOUNTER (EMERGENCY)
Dept: HOSPITAL 87 - ER | Age: 38
LOS: 2 days | Discharge: PSYCHIATRIC OR SUBSTANCE USE FACILITY | End: 2023-03-28
Payer: MEDICAID

## 2023-03-26 VITALS — WEIGHT: 240.3 LBS | HEIGHT: 64 IN | BODY MASS INDEX: 41.03 KG/M2

## 2023-03-26 DIAGNOSIS — E78.00: ICD-10-CM

## 2023-03-26 DIAGNOSIS — Z20.822: ICD-10-CM

## 2023-03-26 DIAGNOSIS — R45.851: Primary | ICD-10-CM

## 2023-03-26 DIAGNOSIS — F20.9: ICD-10-CM

## 2023-03-26 LAB
AMPHETAMINES UR QL SCN: (no result)
BARBITURATES UR QL SCN: NEGATIVE
BENZODIAZ UR QL SCN: NEGATIVE
BZE UR QL SCN: NEGATIVE
CANNABINOIDS UR QL SCN: NEGATIVE
CHLORIDE SERPL-SCNC: 106 MEQ/L (ref 98–107)
ERYTHROCYTE [DISTWIDTH] IN BLOOD BY AUTOMATED COUNT: 14.3 % (ref 11.6–14.6)
ETHANOL SERPL-MCNC: < 10 MG/DL
HCG SERPL QL: NEGATIVE
HCT VFR BLD AUTO: 38.5 % (ref 36–48)
HGB BLD-MCNC: 12.6 G/DL (ref 12–16)
LYMPHOCYTES NFR BLD MANUAL: 20 % (ref 20–60)
MCH RBC QN AUTO: 26.1 PG (ref 28–32)
MCV RBC AUTO: 79.7 FL (ref 81–99)
METHADONE UR QL SCN: NEGATIVE
NEUTS SEG NFR BLD MANUAL: 80 % (ref 45–75)
OPIATES UR QL SCN: NEGATIVE
PCP UR QL SCN: NEGATIVE
PLATELET # BLD AUTO: 226 X1000/UL (ref 130–400)
PLATELET # BLD EST: NORMAL 10*3/UL
PMV BLD AUTO: 9 FL (ref 7.4–10.4)
RBC # BLD AUTO: 4.83 MILL/UL (ref 4.2–5.4)

## 2023-03-26 PROCEDURE — 87426 SARSCOV CORONAVIRUS AG IA: CPT

## 2023-03-26 PROCEDURE — 93005 ELECTROCARDIOGRAM TRACING: CPT

## 2023-03-26 PROCEDURE — 80320 DRUG SCREEN QUANTALCOHOLS: CPT

## 2023-03-26 PROCEDURE — 80053 COMPREHEN METABOLIC PANEL: CPT

## 2023-03-26 PROCEDURE — 96372 THER/PROPH/DIAG INJ SC/IM: CPT

## 2023-03-26 PROCEDURE — C9803 HOPD COVID-19 SPEC COLLECT: HCPCS

## 2023-03-26 PROCEDURE — 80307 DRUG TEST PRSMV CHEM ANLYZR: CPT

## 2023-03-26 PROCEDURE — 85025 COMPLETE CBC W/AUTO DIFF WBC: CPT

## 2023-03-26 PROCEDURE — 99285 EMERGENCY DEPT VISIT HI MDM: CPT

## 2023-03-26 PROCEDURE — 84703 CHORIONIC GONADOTROPIN ASSAY: CPT

## 2023-03-26 PROCEDURE — G0480 DRUG TEST DEF 1-7 CLASSES: HCPCS

## 2023-03-26 PROCEDURE — 80329 ANALGESICS NON-OPIOID 1 OR 2: CPT

## 2023-03-26 PROCEDURE — 80305 DRUG TEST PRSMV DIR OPT OBS: CPT

## 2023-03-26 PROCEDURE — 36415 COLL VENOUS BLD VENIPUNCTURE: CPT

## 2023-03-28 VITALS — SYSTOLIC BLOOD PRESSURE: 115 MMHG | DIASTOLIC BLOOD PRESSURE: 90 MMHG

## 2024-05-11 ENCOUNTER — HOSPITAL ENCOUNTER (EMERGENCY)
Dept: HOSPITAL 87 - ER | Age: 39
LOS: 1 days | Discharge: HOME | End: 2024-05-12
Payer: MEDICAID

## 2024-05-11 VITALS — WEIGHT: 185.19 LBS | HEIGHT: 67 IN | BODY MASS INDEX: 29.07 KG/M2

## 2024-05-11 DIAGNOSIS — F28: Primary | ICD-10-CM

## 2024-05-11 DIAGNOSIS — I10: ICD-10-CM

## 2024-05-11 DIAGNOSIS — E78.00: ICD-10-CM

## 2024-05-11 DIAGNOSIS — F20.9: ICD-10-CM

## 2024-05-11 LAB
BASOPHILS NFR BLD AUTO: 0.5 % (ref 0–2)
BUN SERPL-MCNC: 11 MG/DL (ref 9–23)
CALCIUM SERPL-MCNC: 9.7 MG/DL (ref 8.7–10.4)
CHLORIDE SERPL-SCNC: 108 MEQ/L (ref 98–107)
CO2 SERPL-SCNC: 23 MEQ/L (ref 21–32)
CREAT SERPL-MCNC: 1.2 MG/DL (ref 0.6–1)
EOSINOPHIL NFR BLD AUTO: 0.3 % (ref 0–5)
ERYTHROCYTE [DISTWIDTH] IN BLOOD BY AUTOMATED COUNT: 14.9 % (ref 11.6–14.6)
ETHANOL SERPL-MCNC: < 10 MG/DL (ref ?–10)
GLUCOSE SERPL-MCNC: 93 MG/DL (ref 70–105)
HCT VFR BLD AUTO: 39.9 % (ref 36–48)
HGB BLD-MCNC: 13.2 G/DL (ref 12–16)
LYMPHOCYTES NFR BLD AUTO: 13 % (ref 20–50)
MANUAL DIF COMMENT BLD-IMP: 0
MCH RBC QN AUTO: 26.4 PG (ref 28–32)
MCHC RBC AUTO-ENTMCNC: 33 G/DL (ref 31–37)
MCV RBC AUTO: 79.9 FL (ref 81–99)
MONOCYTES NFR BLD AUTO: 4.7 % (ref 2–8)
NEUTROPHILS NFR BLD AUTO: 81.5 % (ref 40–76)
PLATELET # BLD AUTO: 338 X1000/UL (ref 130–400)
PMV BLD AUTO: 8.5 FL (ref 7.4–10.4)
POTASSIUM SERPL-SCNC: 4.5 MEQ/L (ref 3.5–5.1)
RBC # BLD AUTO: 4.99 MILL/UL (ref 4.2–5.4)
SODIUM SERPL-SCNC: 137 MEQ/L (ref 136–145)
WBC # BLD: 10.7 X1000/UL (ref 4.5–11)

## 2024-05-11 PROCEDURE — 80320 DRUG SCREEN QUANTALCOHOLS: CPT

## 2024-05-11 PROCEDURE — 96372 THER/PROPH/DIAG INJ SC/IM: CPT

## 2024-05-11 PROCEDURE — 36415 COLL VENOUS BLD VENIPUNCTURE: CPT

## 2024-05-11 PROCEDURE — 80048 BASIC METABOLIC PNL TOTAL CA: CPT

## 2024-05-11 PROCEDURE — 85025 COMPLETE CBC W/AUTO DIFF WBC: CPT

## 2024-05-11 PROCEDURE — 99285 EMERGENCY DEPT VISIT HI MDM: CPT

## 2024-05-11 PROCEDURE — G0480 DRUG TEST DEF 1-7 CLASSES: HCPCS

## 2024-05-11 RX ADMIN — OLANZAPINE ONE MG: 10 INJECTION, POWDER, FOR SOLUTION INTRAMUSCULAR at 19:42

## 2024-05-12 VITALS
HEART RATE: 78 BPM | DIASTOLIC BLOOD PRESSURE: 72 MMHG | RESPIRATION RATE: 18 BRPM | TEMPERATURE: 98.4 F | SYSTOLIC BLOOD PRESSURE: 117 MMHG

## 2024-05-12 LAB
AMPHETAMINES UR QL SCN: (no result)
APPEARANCE UR: CLEAR
BACTERIA #/AREA URNS AUTO: (no result) /[HPF]
BARBITURATES UR QL SCN: NEGATIVE
BENZODIAZ UR QL SCN: NEGATIVE
BZE UR QL SCN: NEGATIVE
CANNABINOIDS UR QL SCN: NEGATIVE
COLOR UR: (no result)
GLUCOSE UR STRIP.AUTO-MCNC: NEGATIVE MG/DL
HGB UR QL STRIP: NEGATIVE
HYALINE CASTS #/AREA URNS AUTO: (no result) /LPF
KETONES UR STRIP-MCNC: (no result) MG/DL
LEUKOCYTE ESTERASE UR QL STRIP: (no result)
MDMA UR QL SCN: (no result)
METHADONE UR QL SCN: NEGATIVE
MUCOUS THREADS UR QL AUTO: (no result) /LPF
NITRITE UR QL STRIP: NEGATIVE
OPIATES UR QL SCN: NEGATIVE
PCP UR QL SCN: NEGATIVE
PH UR STRIP: 6 [PH] (ref 4.5–8)
PROT UR QL STRIP: (no result)
RBC #/AREA URNS AUTO: (no result) /HPF (ref 0–2)
SP GR UR STRIP: 1.03 (ref 1–1.03)
SQUAMOUS #/AREA URNS AUTO: (no result) /LPF
UROBILINOGEN UR STRIP-MCNC: 1 E.U./DL (ref 0.2–1)
WBC #/AREA URNS AUTO: (no result) /HPF (ref 0–2)

## 2024-05-14 ENCOUNTER — HOSPITAL ENCOUNTER (EMERGENCY)
Dept: HOSPITAL 87 - ER | Age: 39
Discharge: HOME | End: 2024-05-14
Payer: MEDICAID

## 2024-05-14 VITALS — HEIGHT: 65 IN | WEIGHT: 143.3 LBS | BODY MASS INDEX: 23.88 KG/M2

## 2024-05-14 VITALS
TEMPERATURE: 98 F | RESPIRATION RATE: 18 BRPM | HEART RATE: 86 BPM | DIASTOLIC BLOOD PRESSURE: 70 MMHG | SYSTOLIC BLOOD PRESSURE: 130 MMHG

## 2024-05-14 VITALS — OXYGEN SATURATION: 98 %

## 2024-05-14 DIAGNOSIS — E78.00: ICD-10-CM

## 2024-05-14 DIAGNOSIS — R44.1: Primary | ICD-10-CM

## 2024-05-14 DIAGNOSIS — I10: ICD-10-CM

## 2024-05-14 LAB
AMPHETAMINES UR QL SCN: (no result)
BARBITURATES UR QL SCN: NEGATIVE
BENZODIAZ UR QL SCN: NEGATIVE
BZE UR QL SCN: NEGATIVE
CANNABINOIDS UR QL SCN: NEGATIVE
MDMA UR QL SCN: (no result)
METHADONE UR QL SCN: NEGATIVE
OPIATES UR QL SCN: NEGATIVE
PCP UR QL SCN: NEGATIVE

## 2024-05-14 PROCEDURE — 99284 EMERGENCY DEPT VISIT MOD MDM: CPT

## 2024-05-14 PROCEDURE — 80305 DRUG TEST PRSMV DIR OPT OBS: CPT

## 2024-05-14 RX ADMIN — LORAZEPAM ONE MG: 1 TABLET ORAL at 06:49

## 2024-05-14 RX ADMIN — HALOPERIDOL ONE MG: 5 TABLET ORAL at 06:49
